# Patient Record
Sex: FEMALE | Race: OTHER | Employment: UNEMPLOYED | URBAN - NONMETROPOLITAN AREA
[De-identification: names, ages, dates, MRNs, and addresses within clinical notes are randomized per-mention and may not be internally consistent; named-entity substitution may affect disease eponyms.]

---

## 2024-06-02 ENCOUNTER — OFFICE VISIT (OUTPATIENT)
Dept: URGENT CARE | Facility: MEDICAL CENTER | Age: 34
End: 2024-06-02
Payer: COMMERCIAL

## 2024-06-02 VITALS
HEIGHT: 61 IN | OXYGEN SATURATION: 98 % | BODY MASS INDEX: 19.63 KG/M2 | WEIGHT: 104 LBS | TEMPERATURE: 98.3 F | RESPIRATION RATE: 18 BRPM | HEART RATE: 103 BPM | SYSTOLIC BLOOD PRESSURE: 113 MMHG | DIASTOLIC BLOOD PRESSURE: 84 MMHG

## 2024-06-02 DIAGNOSIS — I88.9 AXILLARY ADENITIS: Primary | ICD-10-CM

## 2024-06-02 PROCEDURE — S9088 SERVICES PROVIDED IN URGENT: HCPCS | Performed by: PHYSICIAN ASSISTANT

## 2024-06-02 PROCEDURE — 99202 OFFICE O/P NEW SF 15 MIN: CPT | Performed by: PHYSICIAN ASSISTANT

## 2024-06-02 RX ORDER — CEPHALEXIN 500 MG/1
500 CAPSULE ORAL EVERY 8 HOURS SCHEDULED
Qty: 21 CAPSULE | Refills: 0 | Status: SHIPPED | OUTPATIENT
Start: 2024-06-02 | End: 2024-06-09

## 2024-06-02 NOTE — PATIENT INSTRUCTIONS
Start Keflex as prescribed  Tylenol or Ibuprofen as needed for pain or fever  If symptoms fail to improve follow up with PCP  If symptoms worsen consider ER evaluation

## 2024-06-02 NOTE — PROGRESS NOTES
[facility]        NAME: Gwen Storm is a 34 y.o. female  : 1990    MRN: [id number]  DATE: 2024  TIME: 1:35 PM    Assessment and Plan   Axillary adenitis [I88.9]  1. Axillary adenitis  cephalexin (KEFLEX) 500 mg capsule            Patient Instructions     Start Keflex as prescribed  Tylenol or Ibuprofen as needed for pain or fever  If symptoms fail to improve follow up with PCP  If symptoms worsen consider ER evaluation    Follow up with PCP in 3-5 days.  Proceed to  ER if symptoms worsen.    If tests have been performed at [facility], our office will contact you with results if changes need to be made to the care plan discussed with you at the visit.  You can review your full results on [facility].    Chief Complaint     Chief Complaint   Patient presents with   • Mass     Mass under left arm carmen with fever x 1 week          History of Present Illness       Patient presents with a 3-day history of tenderness to her left axilla.  She states she feels a lump.  She feels feverish but did not take her temperature.  No redness, rashes or skin lesions.        Review of Systems   Review of Systems   Constitutional:  Positive for chills and fever.   Skin:  Negative for rash and wound.   Hematological:  Positive for adenopathy.         Current Medications       Current Outpatient Medications:   •  cephalexin (KEFLEX) 500 mg capsule, Take 1 capsule (500 mg total) by mouth every 8 (eight) hours for 7 days, Disp: 21 capsule, Rfl: 0    Current Allergies     Allergies as of 2024   • (No Known Allergies)            The following portions of the patient's history were reviewed and updated as appropriate: allergies, current medications, past family history, past medical history, past social history, past surgical history and problem list.     No past medical history on file.    No past surgical history on file.    No family history on file.      Medications have been  "verified.        Objective   /84   Pulse 103   Temp 98.3 °F (36.8 °C)   Resp 18   Ht 5' 1\" (1.549 m)   Wt 47.2 kg (104 lb)   SpO2 98%   BMI 19.65 kg/m²   No LMP recorded.       Physical Exam     Physical Exam  Vitals and nursing note reviewed.   Constitutional:       Appearance: Normal appearance.   HENT:      Head: Normocephalic and atraumatic.   Cardiovascular:      Rate and Rhythm: Normal rate.   Pulmonary:      Effort: Pulmonary effort is normal.   Lymphadenopathy:      Cervical: No cervical adenopathy.      Upper Body:      Left upper body: Axillary adenopathy (Slightly enlarged tender left axillary node which is mobile, no overlying erythema of the skin.) present.   Skin:     General: Skin is warm.   Neurological:      Mental Status: She is alert.                   "